# Patient Record
Sex: FEMALE | HISPANIC OR LATINO | URBAN - METROPOLITAN AREA
[De-identification: names, ages, dates, MRNs, and addresses within clinical notes are randomized per-mention and may not be internally consistent; named-entity substitution may affect disease eponyms.]

---

## 2021-11-06 ENCOUNTER — APPOINTMENT (RX ONLY)
Dept: URBAN - METROPOLITAN AREA CLINIC 47 | Facility: CLINIC | Age: 36
Setting detail: DERMATOLOGY
End: 2021-11-06

## 2021-11-06 DIAGNOSIS — Z41.9 ENCOUNTER FOR PROCEDURE FOR PURPOSES OTHER THAN REMEDYING HEALTH STATE, UNSPECIFIED: ICD-10-CM

## 2021-11-06 PROCEDURE — ? FILLERS

## 2021-11-06 ASSESSMENT — LOCATION SIMPLE DESCRIPTION DERM
LOCATION SIMPLE: LEFT UPPER LIP
LOCATION SIMPLE: RIGHT LIP
LOCATION SIMPLE: LEFT LIP

## 2021-11-06 ASSESSMENT — LOCATION DETAILED DESCRIPTION DERM
LOCATION DETAILED: LEFT SUPERIOR VERMILION LIP
LOCATION DETAILED: LEFT SUPERIOR VERMILION BORDER
LOCATION DETAILED: RIGHT SUPERIOR VERMILION LIP

## 2021-11-06 ASSESSMENT — LOCATION ZONE DERM: LOCATION ZONE: LIP

## 2021-11-06 NOTE — PROCEDURE: FILLERS
Include Cannula Information In Note?: No
Brows Filler  Volume In Cc: 0
Additional Area 1 Location: cheeks
Additional Area 2 Location: lips
Anesthesia Lot #: [id number]
Consent: Written consent obtained. Risks include but not limited to bruising, beading, irregular texture, ulceration, infection, allergic reaction, scar formation, incomplete augmentation, temporary nature, procedural pain.
Anesthesia Expiration Date (Month Year): 02/2022
Filler: Juvederm Ultra Plus XC
Additional Area 3 Location: under eye
Detail Level: Zone
Additional Area 4 Location: chin
Additional Area 5 Location: top lip
Post-Care Instructions: Patient instructed to apply ice to reduce swelling.
Vermilion Lips Filler Volume In Cc: 1
Lot #: [id number]
Use Map Statement For Sites (Optional): Yes
Expiration Date (Month Year): 2022-06-19
Lot #: [id number]
Map Statment: See [address] for Complete Details
Lot #: [id number]
Expiration Date (Month Year): 2022-10-06
Expiration Date (Month Year): 2022-08-21